# Patient Record
Sex: FEMALE | Race: BLACK OR AFRICAN AMERICAN | NOT HISPANIC OR LATINO | URBAN - METROPOLITAN AREA
[De-identification: names, ages, dates, MRNs, and addresses within clinical notes are randomized per-mention and may not be internally consistent; named-entity substitution may affect disease eponyms.]

---

## 2022-12-05 ENCOUNTER — OFFICE VISIT (OUTPATIENT)
Dept: URBAN - METROPOLITAN AREA CLINIC 52 | Facility: CLINIC | Age: 82
End: 2022-12-05

## 2022-12-07 ENCOUNTER — LAB OUTSIDE AN ENCOUNTER (OUTPATIENT)
Dept: URBAN - METROPOLITAN AREA CLINIC 52 | Facility: CLINIC | Age: 82
End: 2022-12-07

## 2022-12-07 ENCOUNTER — WEB ENCOUNTER (OUTPATIENT)
Dept: URBAN - METROPOLITAN AREA CLINIC 52 | Facility: CLINIC | Age: 82
End: 2022-12-07

## 2022-12-07 ENCOUNTER — DASHBOARD ENCOUNTERS (OUTPATIENT)
Age: 82
End: 2022-12-07

## 2022-12-07 ENCOUNTER — OFFICE VISIT (OUTPATIENT)
Dept: URBAN - METROPOLITAN AREA CLINIC 52 | Facility: CLINIC | Age: 82
End: 2022-12-07
Payer: COMMERCIAL

## 2022-12-07 VITALS
TEMPERATURE: 97.7 F | WEIGHT: 140.8 LBS | DIASTOLIC BLOOD PRESSURE: 79 MMHG | HEIGHT: 63 IN | BODY MASS INDEX: 24.95 KG/M2 | SYSTOLIC BLOOD PRESSURE: 193 MMHG | OXYGEN SATURATION: 99 % | HEART RATE: 77 BPM

## 2022-12-07 DIAGNOSIS — R14.2 BELCHING SYMPTOM: ICD-10-CM

## 2022-12-07 DIAGNOSIS — R10.13 EPIGASTRIC DISCOMFORT: ICD-10-CM

## 2022-12-07 DIAGNOSIS — K21.9 GASTROESOPHAGEAL REFLUX DISEASE, UNSPECIFIED WHETHER ESOPHAGITIS PRESENT: ICD-10-CM

## 2022-12-07 PROCEDURE — 99203 OFFICE O/P NEW LOW 30 MIN: CPT | Performed by: PHYSICIAN ASSISTANT

## 2022-12-07 RX ORDER — INDAPAMIDE 1.25 MG/1
TAKE 1 TABLET BY MOUTH EVERY MORNING TABLET, FILM COATED ORAL
Qty: 30 EACH | Refills: 0 | Status: ACTIVE | COMMUNITY

## 2022-12-07 RX ORDER — SIMETHICONE 125 MG/1
1 TABLET AFTER MEALS AND AT BEDTIME AS NEEDED FOR GAS AND BELCHING TABLET, CHEWABLE ORAL
Qty: 90 | Refills: 1 | OUTPATIENT
Start: 2022-12-07 | End: 2023-02-05

## 2022-12-07 RX ORDER — AMLODIPINE BESYLATE 5 MG/1
TAKE 1 TABLET BY MOUTH EVERY DAY FOR 60 DAYS TABLET ORAL
Qty: 30 EACH | Refills: 0 | Status: ON HOLD | COMMUNITY

## 2022-12-07 RX ORDER — PANTOPRAZOLE SODIUM 20 MG/1
TAKE 1 TABLET BY MOUTH 1 HOUR PRIOR TO 1ST MEAL OF THE DAY TABLET, DELAYED RELEASE ORAL
Qty: 30 EACH | Refills: 0 | Status: ON HOLD | COMMUNITY

## 2022-12-07 NOTE — HPI-TODAY'S VISIT:
This is an 82 y.o. female with GERD and HTN who presents to office c/o belching. She is on Protonix 20mg, which  helps with reflux, but does not help with increased gas and belching. She does report mild epigastric discomfort, but denies nausea, vomiting, black or bloody stools, weight loss.

## 2023-02-01 PROBLEM — 235595009: Status: ACTIVE | Noted: 2022-12-07

## 2023-03-31 ENCOUNTER — OFFICE VISIT (OUTPATIENT)
Dept: URBAN - METROPOLITAN AREA SURGERY CENTER 17 | Facility: SURGERY CENTER | Age: 83
End: 2023-03-31
Payer: COMMERCIAL

## 2023-03-31 ENCOUNTER — CLAIMS CREATED FROM THE CLAIM WINDOW (OUTPATIENT)
Dept: URBAN - METROPOLITAN AREA CLINIC 4 | Facility: CLINIC | Age: 83
End: 2023-03-31
Payer: COMMERCIAL

## 2023-03-31 DIAGNOSIS — K31.89 OTHER DISEASES OF STOMACH AND DUODENUM: ICD-10-CM

## 2023-03-31 DIAGNOSIS — K21.9 ACID REFLUX: ICD-10-CM

## 2023-03-31 DIAGNOSIS — K31.89 ACQUIRED DEFORMITY OF DUODENUM: ICD-10-CM

## 2023-03-31 DIAGNOSIS — R10.13 ABDOMINAL DISCOMFORT, EPIGASTRIC: ICD-10-CM

## 2023-03-31 PROCEDURE — 88312 SPECIAL STAINS GROUP 1: CPT | Performed by: PATHOLOGY

## 2023-03-31 PROCEDURE — 43239 EGD BIOPSY SINGLE/MULTIPLE: CPT | Performed by: INTERNAL MEDICINE

## 2023-03-31 PROCEDURE — 88305 TISSUE EXAM BY PATHOLOGIST: CPT | Performed by: PATHOLOGY

## 2023-03-31 PROCEDURE — G8907 PT DOC NO EVENTS ON DISCHARG: HCPCS | Performed by: INTERNAL MEDICINE

## 2023-03-31 RX ORDER — PANTOPRAZOLE SODIUM 20 MG/1
TAKE 1 TABLET BY MOUTH 1 HOUR PRIOR TO 1ST MEAL OF THE DAY TABLET, DELAYED RELEASE ORAL
Qty: 30 EACH | Refills: 0 | Status: ON HOLD | COMMUNITY

## 2023-03-31 RX ORDER — AMLODIPINE BESYLATE 5 MG/1
TAKE 1 TABLET BY MOUTH EVERY DAY FOR 60 DAYS TABLET ORAL
Qty: 30 EACH | Refills: 0 | Status: ON HOLD | COMMUNITY

## 2023-03-31 RX ORDER — INDAPAMIDE 1.25 MG/1
TAKE 1 TABLET BY MOUTH EVERY MORNING TABLET, FILM COATED ORAL
Qty: 30 EACH | Refills: 0 | Status: ACTIVE | COMMUNITY